# Patient Record
Sex: MALE | NOT HISPANIC OR LATINO | Employment: UNEMPLOYED | URBAN - METROPOLITAN AREA
[De-identification: names, ages, dates, MRNs, and addresses within clinical notes are randomized per-mention and may not be internally consistent; named-entity substitution may affect disease eponyms.]

---

## 2023-01-07 ENCOUNTER — HOSPITAL ENCOUNTER (EMERGENCY)
Facility: CLINIC | Age: 28
Discharge: HOME | End: 2023-01-07
Attending: EMERGENCY MEDICINE | Admitting: EMERGENCY MEDICINE

## 2023-01-07 VITALS
RESPIRATION RATE: 16 BRPM | SYSTOLIC BLOOD PRESSURE: 114 MMHG | BODY MASS INDEX: 22.76 KG/M2 | OXYGEN SATURATION: 100 % | DIASTOLIC BLOOD PRESSURE: 66 MMHG | TEMPERATURE: 97.1 F | HEART RATE: 57 BPM | HEIGHT: 67 IN | WEIGHT: 145 LBS

## 2023-01-07 DIAGNOSIS — K08.89 ODONTALGIA: ICD-10-CM

## 2023-01-07 PROCEDURE — 99283 EMERGENCY DEPT VISIT LOW MDM: CPT

## 2023-01-07 RX ORDER — OXYCODONE AND ACETAMINOPHEN 5; 325 MG/1; MG/1
1 TABLET ORAL EVERY 6 HOURS PRN
Qty: 12 TABLET | Refills: 0 | Status: SHIPPED | OUTPATIENT
Start: 2023-01-07 | End: 2023-01-10

## 2023-01-07 RX ORDER — IBUPROFEN 600 MG/1
600 TABLET, FILM COATED ORAL EVERY 6 HOURS PRN
Qty: 28 TABLET | Refills: 0 | Status: SHIPPED | OUTPATIENT
Start: 2023-01-07

## 2023-01-08 NOTE — ED TRIAGE NOTES
Pt has been having pain to R upper wisdom tooth over the past month.     Triage Assessment     Row Name 01/07/23 4438       Triage Assessment (Adult)    Airway WDL WDL       Respiratory WDL    Respiratory WDL WDL       Skin Circulation/Temperature WDL    Skin Circulation/Temperature WDL WDL       Cardiac WDL    Cardiac WDL WDL       Peripheral/Neurovascular WDL    Peripheral Neurovascular WDL WDL       Cognitive/Neuro/Behavioral WDL    Cognitive/Neuro/Behavioral WDL WDL

## 2023-01-08 NOTE — ED PROVIDER NOTES
EMERGENCY DEPARTMENT ENCOUNTER      NAME: Kelly Mcrae  AGE: 27 year old male  YOB: 1995  MRN: [id number]  EVALUATION DATE & TIME: No admission date for patient encounter.    PCP: No Ref-Primary, Physician    ED PROVIDER: Dhruv Anguiano M.D.      Chief Complaint   Patient presents with     Dental Pain         FINAL IMPRESSION:  Impacted wisdom tooth #1.      ED COURSE & MEDICAL DECISION MAKIN PM.  I met with the patient to gather history and to perform my initial exam. We discussed plans for the ED course, including diagnostic testing and treatment. PPE worn: cloth mask.  Patient with right upper jaw pain and points to tooth #1.  On examination, patient has an impacted wisdom tooth where the tooth is coming out crooked.  Generalized soreness in the area without evidence for abscess or infection.  Patient will be discharged home with prescription for ibuprofen and pain medicine.  Patient counseled to follow-up with a dentist for referral for wisdom tooth removal.  Patient in agreement with the plan.      Pertinent Labs & Imaging studies reviewed. (See chart for details)  27 year old male presents to the Emergency Department for evaluation of dental pain.    At the conclusion of the encounter I discussed the results of all of the tests and the disposition. The questions were answered. The patient or family acknowledged understanding and was agreeable with the care plan.       Medical Decision Making    History:    Supplemental history from: Documented in HPI, if applicable    External Record(s) reviewed: Inpatient Record: Inpatient computer records reviewed.    Work Up:    Chart documentation includes differential considered and any EKGs or imaging independently interpreted by provider.    In additional to work up documented, I considered the following work up: See chart documentation, if applicable.    External consultation:    Discussion of management with another provider: See chart  "documentation, if applicable    Complicating factors:    Care impacted by chronic illness: N/A    Care affected by social determinants of health: N/A    Disposition considerations: Discharge to home.          MEDICATIONS GIVEN IN THE EMERGENCY:  Medications - No data to display    NEW PRESCRIPTIONS STARTED AT TODAY'S ER VISIT  New Prescriptions    No medications on file          =================================================================    HPI    Patient information was obtained from: The patient.    Use of : N/A         Kelly Mcrae is a 27 year old male who presents to this ED today for evaluation of right upper tooth pain.  Patient has pain at tooth #1 in the right upper jaw.  He has been having pain for the last month.  Patient has not done anything for the pain or seen anybody for the pain.    He does not identify any waxing or waning symptoms otherwise, exacerbating or alleviating features,associated symptoms except as mentioned.  He otherwise denies any pain related complaints.    REVIEW OF SYSTEMS   Patient complaining of dental pain.  No other complaints on review of systems.    PAST MEDICAL HISTORY:  No past medical history on file.    PAST SURGICAL HISTORY:  No past surgical history on file.        CURRENT MEDICATIONS:    No current outpatient medications on file.      ALLERGIES:  No Known Allergies    FAMILY HISTORY:  No family history on file.    SOCIAL HISTORY:   Social History     Socioeconomic History     Marital status: Single       VITALS:  /66   Pulse 57   Temp 97.1  F (36.2  C) (Temporal)   Resp 16   Ht 1.702 m (5' 7\")   Wt 65.8 kg (145 lb)   SpO2 100%   BMI 22.71 kg/m      PHYSICAL EXAM    Vital Signs:  /66   Pulse 57   Temp 97.1  F (36.2  C) (Temporal)   Resp 16   Ht 1.702 m (5' 7\")   Wt 65.8 kg (145 lb)   SpO2 100%   BMI 22.71 kg/m    General:  On entering the room he appears to be in moderate pain or discomfort.  Neck:  Neck supple with full range " of motion and nontender.    Back:  Back and spine are nontender.  No costovertebral angle tenderness.    HEENT:  Oropharynx clear with moist mucous membranes.  HEENT unremarkable.  Tooth #1, the right upper wisdom tooth is coming out [place] and he appears to have an impacted wisdom tooth in that area.  No evidence for abscess or infection.  Pulmonary:  Chest clear to auscultation without rhonchi rales or wheezing.    Cardiovascular:  Cardiac regular rate and rhythm without murmurs rubs or gallops.    Abdomen:  Abdomen soft nontender.  There is no rebound or guarding.    Muskuloskeletal:  he moves all 4 without any difficulty and has normal neurovascular exams.  Extremities without clubbing, cyanosis, or edema.  Legs and calves are nontender.    Neuro:  he is alert and oriented ×3 and moves all extremities symmetrically.    Psych:  Normal affect.    Skin:  Unremarkable and warm and dry.       LAB:  All pertinent labs reviewed and interpreted.  Labs Ordered and Resulted from Time of ED Arrival to Time of ED Departure - No data to display    RADIOLOGY:  Reviewed all pertinent imaging. Please see official radiology report.  No orders to display                  Dhruv Anguiano M.D.  Emergency Medicine  [facility] EMERGENCY ROOM  [address] [facility] [id number] [phone]  Dept: [phone]       Dhruv Anguiano MD  01/07/23 [id number]

## 2023-03-07 NOTE — DISCHARGE INSTRUCTIONS
Ice off-and-on to jaw area can help with pain.  Ibuprofen as prescribed.  1 Percocet every 6 hours if needed for stronger pain.  Do not drive with this.  You need to see a dentist as soon as possible and have that wisdom tooth removed or be referred to somebody that we will remove it.  Soft diet.   no